# Patient Record
Sex: MALE | Race: ASIAN | NOT HISPANIC OR LATINO | URBAN - METROPOLITAN AREA
[De-identification: names, ages, dates, MRNs, and addresses within clinical notes are randomized per-mention and may not be internally consistent; named-entity substitution may affect disease eponyms.]

---
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
## 2022-05-31 ENCOUNTER — EMERGENCY (EMERGENCY)
Facility: HOSPITAL | Age: 22
LOS: 1 days | Discharge: HOME | End: 2022-05-31
Attending: EMERGENCY MEDICINE | Admitting: EMERGENCY MEDICINE
Payer: COMMERCIAL

## 2022-05-31 VITALS
HEIGHT: 72 IN | DIASTOLIC BLOOD PRESSURE: 76 MMHG | TEMPERATURE: 98 F | RESPIRATION RATE: 18 BRPM | SYSTOLIC BLOOD PRESSURE: 126 MMHG | OXYGEN SATURATION: 98 % | HEART RATE: 90 BPM | WEIGHT: 171.3 LBS

## 2022-05-31 VITALS
OXYGEN SATURATION: 99 % | TEMPERATURE: 98 F | HEART RATE: 88 BPM | SYSTOLIC BLOOD PRESSURE: 121 MMHG | DIASTOLIC BLOOD PRESSURE: 75 MMHG | RESPIRATION RATE: 17 BRPM

## 2022-05-31 LAB
APPEARANCE UR: CLEAR — SIGNIFICANT CHANGE UP
BILIRUB UR-MCNC: NEGATIVE — SIGNIFICANT CHANGE UP
COLOR SPEC: YELLOW — SIGNIFICANT CHANGE UP
DIFF PNL FLD: NEGATIVE — SIGNIFICANT CHANGE UP
GLUCOSE UR QL: NEGATIVE MG/DL — SIGNIFICANT CHANGE UP
KETONES UR-MCNC: NEGATIVE — SIGNIFICANT CHANGE UP
LEUKOCYTE ESTERASE UR-ACNC: NEGATIVE — SIGNIFICANT CHANGE UP
NITRITE UR-MCNC: NEGATIVE — SIGNIFICANT CHANGE UP
PH UR: 6 — SIGNIFICANT CHANGE UP (ref 5–8)
PROT UR-MCNC: NEGATIVE MG/DL — SIGNIFICANT CHANGE UP
SP GR SPEC: 1.02 — SIGNIFICANT CHANGE UP (ref 1.01–1.02)
UROBILINOGEN FLD QL: NEGATIVE MG/DL — SIGNIFICANT CHANGE UP

## 2022-05-31 PROCEDURE — 99284 EMERGENCY DEPT VISIT MOD MDM: CPT | Mod: 25

## 2022-05-31 PROCEDURE — 76870 US EXAM SCROTUM: CPT | Mod: 26

## 2022-05-31 PROCEDURE — 87086 URINE CULTURE/COLONY COUNT: CPT

## 2022-05-31 PROCEDURE — 76870 US EXAM SCROTUM: CPT

## 2022-05-31 PROCEDURE — 99284 EMERGENCY DEPT VISIT MOD MDM: CPT

## 2022-05-31 PROCEDURE — 81003 URINALYSIS AUTO W/O SCOPE: CPT

## 2022-05-31 RX ORDER — CHOLECALCIFEROL (VITAMIN D3) 125 MCG
1 CAPSULE ORAL
Qty: 0 | Refills: 0 | DISCHARGE

## 2022-05-31 NOTE — ED ADULT TRIAGE NOTE - CHIEF COMPLAINT QUOTE
I have pain in my left testicle x few days; this happened about 2 years ago and was diagnosed with epidiymis. I had it again 6 months ago and was placed on abx and it went away.

## 2022-05-31 NOTE — ED PROVIDER NOTE - OBJECTIVE STATEMENT
23 y/o male presents to the ED c/o left testicle pain x few days. Pt states he noticed a little redness and swelling to the area. No fever, N/V, penile discharge, urinary symptoms, or any other symptoms. Pt states he has history of epididymitis 2 yrs ago and this feels similar. Pt denies any sexual contact.

## 2022-05-31 NOTE — ED PROVIDER NOTE - NS_ ATTENDINGSCRIBEDETAILS _ED_A_ED_FT
Edenilson La MD - The scribe's documentation has been prepared under my direction and personally reviewed by me in its entirety. I confirm that the note above accurately reflects all work, treatment, procedures, and medical decision making performed by me.

## 2022-05-31 NOTE — ED PROVIDER NOTE - CHPI ED SYMPTOMS POS
[facility] Emergency Department  [address]  [place] [address]  Phone: [phone]  Fax: [phone]                                    Keith Em   MRN: [id number]    Department: [facility] Emergency Department   Date of Visit: 12/18/2020           After Visit Summary Signature Page    I have received my discharge instructions, and my questions have been answered. I have discussed any challenges I see with this plan with the nurse or doctor.    ..........................................................................................................................................  Patient/Patient Representative Signature      ..........................................................................................................................................  Patient Representative Print Name and Relationship to Patient    ..................................................               ................................................  Date                                   Time    ..........................................................................................................................................  Reviewed by Signature/Title    ...................................................              ..............................................  Date                                               Time          22EPIC Rev 08/18        PAIN

## 2022-05-31 NOTE — ED PROVIDER NOTE - CARE PROVIDER_API CALL
Dano Bae)  Urology  [address], [address]  [place] [address]  Phone: [phone]  Fax: [phone]  Follow Up Time: 1-3 Days

## 2022-05-31 NOTE — ED PROVIDER NOTE - CLINICAL SUMMARY MEDICAL DECISION MAKING FREE TEXT BOX
Pt with left testicular pain x few days which is similar to past epididymitis. Will get US and urine. Will treat based on findings.

## 2022-05-31 NOTE — ED PROVIDER NOTE - GENITOURINARY, MLM
Penis is normal; no discharge. Meatus normal. Scrotum normal. Testicles normal size and lie. No testicular tenderness elicited. +Mild tenderness to epididymal head on left side.

## 2022-05-31 NOTE — ED PROVIDER NOTE - PATIENT PORTAL LINK FT
You can access the FollowMyHealth Patient Portal offered by [facility] by registering at the following website: [email or web address]. By joining [facility]’s FollowMyHealth portal, you will also be able to view your health information using other applications (apps) compatible with our system.

## 2022-05-31 NOTE — ED ADULT NURSE NOTE - OBJECTIVE STATEMENT
pt comes to ED c./o L testicular pain x few days, pt w/ hx of epidiymis 2 years ago. pt denies fever, chills, n/v, penile discharge or other complaints. no recent unprotected sex.

## 2022-05-31 NOTE — ED ADULT NURSE NOTE - NSIMPLEMENTINTERV_GEN_ALL_ED
"Chief Complaint   Patient presents with     Recheck Medication       Initial BP 98/54 mmHg  Pulse 70  Temp(Src) 97.7  F (36.5  C) (Tympanic)  Ht 4' 3.85\" (1.317 m)  Wt 52 lb 9.6 oz (23.859 kg)  BMI 13.76 kg/m2 Estimated body mass index is 13.76 kg/(m^2) as calculated from the following:    Height as of this encounter: 4' 3.85\" (1.317 m).    Weight as of this encounter: 52 lb 9.6 oz (23.859 kg).  BP completed using cuff size: pediatric    Dayana Mccollum MA      " Implemented All Universal Safety Interventions:  [place] to call system. Call bell, personal items and telephone within reach. Instruct patient to call for assistance. Room bathroom lighting operational. Non-slip footwear when patient is off stretcher. Physically safe environment: no spills, clutter or unnecessary equipment. Stretcher in lowest position, wheels locked, appropriate side rails in place.

## 2022-06-02 LAB
CULTURE RESULTS: SIGNIFICANT CHANGE UP
SPECIMEN SOURCE: SIGNIFICANT CHANGE UP

## 2022-12-13 NOTE — ED PROVIDER NOTE - NS ED MD EM SELECTION
[address] Comprehensive Colchicine Counseling:  Patient counseled regarding adverse effects including but not limited to stomach upset (nausea, vomiting, stomach pain, or diarrhea).  Patient instructed to limit alcohol consumption while taking this medication.  Colchicine may reduce blood counts especially with prolonged use.  The patient understands that monitoring of kidney function and blood counts may be required, especially at baseline. The patient verbalized understanding of the proper use and possible adverse effects of colchicine.  All of the patient's questions and concerns were addressed.

## 2023-08-18 NOTE — ED PROVIDER NOTE - GENITOURINARY [+], MLM
Follow up to discuss litholink (24 hour urine results) and blood work results  in 6-8 weeks.   Kidney stone prevention pamphlet provided.     Care After a Cystoscopy     Diet  Avoid caffeine products - coffee, tea, chocolate and soda.  Drink lots of fluids     Common side effects   You may experience frequent, burning or pink-tinged urine.   If blood in urine increases, drinking more fluids and resting will help.     Notify your physician if you:  Have elevated temperature over 101º F   Urinate a large amount of blood or clots  Have difficulty urinating     You may take over-the-counter AZO to help alleviate any painful urination following the cystoscopy today. If urinary discomfort does not improve after 48 hours, please contact our office at [phone].       left testicular pain

## 2024-03-11 NOTE — ED ADULT NURSE NOTE - NS ED NURSE IV DC DT
[de-identified] : euflexx injection in left knee joint.  exp: 03/30/2025 lot: [id number] man: ashlee ndc: [id number] 20-May-2020 20:59

## 2024-11-12 PROBLEM — Z00.00 ENCOUNTER FOR PREVENTIVE HEALTH EXAMINATION: Status: ACTIVE | Noted: 2024-11-12

## 2024-11-13 ENCOUNTER — APPOINTMENT (OUTPATIENT)
Dept: PHYSICAL MEDICINE AND REHAB | Facility: CLINIC | Age: 24
End: 2024-11-13

## 2025-07-22 NOTE — ED ADULT NURSE NOTE - BREATH SOUNDS, MLM
Previous INR on 7/7 was 2.3 dose maintained   INR today is above goal at 3.6  Pt advises he normally takes warfarin in AM, but forgot yesterday and took it last night and then also took his dose this AM today. Advised pt to not do that, if he does not realize he missed a dose within a couple hours do not take till next dose. Pt verbalized understanding.   Decrease 11.1% to 6 mg Tuesday and 3 mg ROW  Recheck INR in 2 weeks on 8/5  Call your physician or seek medical care immediately if you notice any of the following symptoms of a bleed:   Red, dark, coffee or cola colored urine  Red or tar like stools  Excessive bleeding from gums or nose  Vomiting coffee colored or bright red material  Coughing up red tinged sputum  Severe or unprovoked pain (ex: severe Headache or Abdominal pain)  Sudden, spontaneous bruising for no reason  For female patients:  Excessive menstrual bleeding  A cut that will not stop bleeding within 10-15 mins    Karan Barclay M.D. is supervising the treatment and reachable.     Clear